# Patient Record
Sex: FEMALE | Race: WHITE
[De-identification: names, ages, dates, MRNs, and addresses within clinical notes are randomized per-mention and may not be internally consistent; named-entity substitution may affect disease eponyms.]

---

## 2017-01-18 ENCOUNTER — HOSPITAL ENCOUNTER (OUTPATIENT)
Dept: HOSPITAL 61 - PNCL | Age: 77
Discharge: HOME | End: 2017-01-18
Attending: ANESTHESIOLOGY
Payer: MEDICARE

## 2017-01-18 DIAGNOSIS — M51.26: ICD-10-CM

## 2017-01-18 DIAGNOSIS — M54.16: Primary | ICD-10-CM

## 2017-01-18 DIAGNOSIS — M48.06: ICD-10-CM

## 2017-01-18 PROCEDURE — G0463 HOSPITAL OUTPT CLINIC VISIT: HCPCS

## 2017-04-17 ENCOUNTER — HOSPITAL ENCOUNTER (OUTPATIENT)
Dept: HOSPITAL 61 - PNCL | Age: 77
Discharge: HOME | End: 2017-04-17
Attending: ANESTHESIOLOGY
Payer: MEDICARE

## 2017-04-17 DIAGNOSIS — M48.06: ICD-10-CM

## 2017-04-17 DIAGNOSIS — M54.16: Primary | ICD-10-CM

## 2017-04-17 DIAGNOSIS — M51.26: ICD-10-CM

## 2017-04-17 PROCEDURE — G0463 HOSPITAL OUTPT CLINIC VISIT: HCPCS

## 2017-04-18 NOTE — PAIN
DATE OF SERVICE:  04/17/2017



PROGRESS NOTE FOR PAIN CLINIC 



DIAGNOSES:  Lumbar radiculopathy with lumbar herniated disk, lumbar spinal

stenosis with spinal cord stimulator.



HISTORY OF PRESENT ILLNESS:  The patient is a 77-year-old female, who returns

for followup status post medication management with both Duragesic patches and

oxycodone.  The patient reports she has been doing very well with this, her pain

very stable.  Using a Duragesic patch in both 125 mcg sizes combining these ____

125/150 as necessary.  The patient reports she has only been using 150 dose

rarely, but was having a flareup with some diverticulitis and did use these more

during that time.  About a month ago, the patient reports she is doing well with

medications, has no significant side effects except for constipation, which she

is using ____ for pain, is controlling it very effectively by her report.  The

patient is maintaining hydration has no new motor or sensory deficits or other

complaints, but still significant pain in the low back, bilateral lower

extremities, worse on the left than the right, also in the neck and shoulders,

upper extremities with some aching, sharp, dull, shooting pain as cramping and

stabbing, burning, tingling, constant times worse with activities.  The patient

reports it is controlled, but she always has some pain anywhere from 3 to 9 on a

scale of 10. Describing depending on activity that is worse with activity.  The

patient reports no new motor or sensory deficits, no new bowel or bladder

incontinence, no side effects with the medications reports overall about 75%-80%

improvement with medicines by themselves.



PHYSICAL EXAMINATION:

VITAL SIGNS:  Today, the patient's blood pressure 130/79, pulse 96, respirations

are 18, temperature is 98.2 degrees Fahrenheit, height is 69 inches, weighs 220

pounds.

GENERAL:  The patient is awake, alert, oriented, appropriate, very pleasant

demeanor.

The patient is accompanied by her ____.

HEENT:  Head shows normocephalic, atraumatic.  Extraocular movements are intact

and symmetrical.  The patient wears eye glasses.  Oral cavity, her mucous

membranes are moist and pink.  Dentition is intact.

NECK:  Shows anterior throat supple without palpable lymphadenopathy noted. 

Swallow reflex is symmetrical.

CHEST:  Shows normal on inspection.  Breath sounds are clear to auscultation

bilaterally.

HEART:  Shows S1 and S2 clear.  No murmurs auscultated.

ABDOMEN:  Soft, nontender, nondistended.  No palpable organomegaly.  No rebound

or guarding demonstrated.

BACK:  Shows spine grossly midline.  There is some well-healed surgical scarring

in the lumbar distribution and spinal cord stimulator is easily palpable over

the right posterior gluteus paraspinous musculature shows some mild tenderness

to palpation, but only diffusely without significant atrophy, hypertrophy, no

radiation of pain, no tenderness over the sacrum or sacroiliac regions.

EXTREMITIES:  Lower extremities showed deep tendon reflexes 1+ in the patellar

and tendo calcaneus tendons.  Motor exam is approximately 4 on a scale of 5, but

is equal and symmetrical with dorsiflexion, extension a 3 or 4 on a scale of 5

with quadriceps and hamstring flexion again symmetrical right and left.



Options were discussed with the patient.  At this time, the patient's old chart

was reviewed as her current medication regimen updated.  Current review of

systems updated today as well.  We will refill the patient's Duragesic patches

at 100 mcg patches also 25 mcg patches also refilled oxycodone at 10 mg with

instructions, side effects to be aware of discussed with each of medications. 

The patient will have a 90-day supply will follow up in 90 days or sooner as

necessary.

 



______________________________

JERRY CUEVA MD



DR:  FAYE/zane  JOB#:  [id number] / [id number]

DD:  04/17/2017 12:55  DT:  04/18/2017 00:09

## 2017-07-10 ENCOUNTER — HOSPITAL ENCOUNTER (OUTPATIENT)
Dept: HOSPITAL 61 - PNCL | Age: 77
Discharge: HOME | End: 2017-07-10
Attending: ANESTHESIOLOGY
Payer: MEDICARE

## 2017-07-10 DIAGNOSIS — M51.16: ICD-10-CM

## 2017-07-10 DIAGNOSIS — M48.06: Primary | ICD-10-CM

## 2017-07-10 PROCEDURE — G0463 HOSPITAL OUTPT CLINIC VISIT: HCPCS

## 2018-04-20 ENCOUNTER — HOSPITAL ENCOUNTER (OUTPATIENT)
Dept: HOSPITAL 61 - PNCL | Age: 78
Discharge: HOME | End: 2018-04-20
Attending: ANESTHESIOLOGY
Payer: MEDICARE

## 2018-04-20 DIAGNOSIS — M48.061: ICD-10-CM

## 2018-04-20 DIAGNOSIS — M51.16: Primary | ICD-10-CM

## 2018-07-13 ENCOUNTER — HOSPITAL ENCOUNTER (OUTPATIENT)
Dept: HOSPITAL 61 - PNCL | Age: 78
Discharge: HOME | End: 2018-07-13
Attending: ANESTHESIOLOGY
Payer: MEDICARE

## 2018-07-13 DIAGNOSIS — M51.16: Primary | ICD-10-CM

## 2018-07-13 DIAGNOSIS — M48.061: ICD-10-CM

## 2018-10-16 ENCOUNTER — HOSPITAL ENCOUNTER (OUTPATIENT)
Dept: HOSPITAL 61 - PNCL | Age: 78
Discharge: HOME | End: 2018-10-16
Attending: ANESTHESIOLOGY
Payer: MEDICARE

## 2018-10-16 DIAGNOSIS — M48.061: ICD-10-CM

## 2018-10-16 DIAGNOSIS — M51.16: Primary | ICD-10-CM

## 2018-10-16 PROCEDURE — G0463 HOSPITAL OUTPT CLINIC VISIT: HCPCS

## 2018-10-16 NOTE — PAIN
DATE OF SERVICE:  10/16/2018



PROGRESS NOTE FOR PAIN CLINIC



DIAGNOSES:  Lumbar radiculopathy with lumbar herniated disk, lumbar spinal

stenosis with spinal cord stimulator therapy.



SUBJECTIVE:   The patient is a 78-year-old female who returns for follow up

status post medication management as well as spinal cord stimulator therapy,

which she has had for several years.  The patient returns, I had spoken with her

son about 2 weeks ago, as she had increased amount pain, we have prescribed some

oxycodone for her at 20 mg size that she was currently taking 10.  These have

not helped significantly and the patient had actually been breaking them in half

and using them at 10 mg size more than not.  The patient also wearing Duragesic

patches 100 mcg.  In addition, a 50 or 25 or both to make up to 175 mcg.  The

patient reported she was cleaning her house a few weeks ago and this seems to

exacerbate the pain in her low back, bilateral lower extremities, especially in

her left leg and also in the shoulders, upper back and neck.  The patient

reports the pain is doing better now; however, still not some quite as good as

it was prior to her cleaning.  She is improved.  The patient reports it has been

awaking her from sleep at night, has difficulty especially in the low back.  Her

stimulator reports she is still getting good coverage, but is not decreasing the

pain to sufficient amount especially in the shoulders and upper back.  The

patient reports no new motor or sensory deficits.  No new bowel or bladder

incontinence.  The patient reports the pain is tingling, burning, stabbing,

aching, dull, tight, constant, becoming more severe, on and off in intensity,

worse with walking, standing and changing positions or stretching her legs.  The

patient does do some exercises, which she tries to do regularly and this

exacerbates some left hip pain as she was having over the past few days as well.

 The patient rates her pain as 8 on a scale of 10 at its worst, 6-7 on average,

4-5 at its least and is a 6 today.



PHYSICAL EXAMINATION:

VITAL SIGNS:  Blood pressure 160/83, pulse 87, respirations 16, temperature 98.2

degrees Fahrenheit, height and weight were deferred per the patient request.

HEENT:  Head shows normocephalic, atraumatic.  The patient wears eye glasses. 

Extraocular movements are intact and symmetrical.  Oral cavity:  Mucous

membranes moist and pink.  Dentition is intact.  The patient is sitting upright

in the wheelchair.

CHEST:  Shows normal with inspection.  Breath sounds are clear to auscultation

bilaterally.

NECK:  Shows anterior throat supple without palpable lymphadenopathy noted. 

Swallow reflex is symmetrical.  Good rotation of motion both laterally as well

as extension and flexion without significant difficulty.

CARDIOVASCULAR:  Heart sounds S1, S2 clear.

ABDOMEN:  Obese, soft, nontender, nondistended, no palpable organomegaly is

noted.

BACK:  Shows spine grossly in the midline.  Exaggeration of thoracic kyphosis is

mild to moderate with some flattening lumbar lordotic curvature with well-healed

surgical scars from a spinal cord stimulator implant with palpation shows some

moderate tenderness throughout the upper, middle and lower distribution of the

paraspinous muscles, but diffusely without radiation or trigger points. 

Moderate tenderness over the posterior sacrum and sacroiliac regions very mild

tenderness.  The patient shows good rotational motion of lumbar spine without

significant increase in pain excluding extension and flexion.

EXTREMITIES:  The patient's lower extremity deep tendon reflexes 1+ at the

patellar tendons.  Motor exam was approximately 4 on a scale of 5 with

dorsiflexion and extension, and 3 on a scale of 5 with quadriceps and hamstring

flexion.  Peripheral pulses are 1+ without edema bilaterally.



Options were discussed with the patient and the patient's son who accompanies

her to visit today.  We will change the patient's oxycodone to 20 mg size with

nausea.  She will break these in half if necessary as she feels more comfortable

with a smaller dose, but sometimes needs a higher dose.  We discussed this in

detail today as most of the Duragesic patch will maintain at the base of 100 mcg

with an additional 50 to 75 mcg.  Again, she is somewhat lenient about going to

200 mcg dose or anything consistently higher than 150, but we have the 25's

available in case if she does have some of the morning flares of pain as this

helped her very well here in the past few weeks by increasing to 175, she would

stay at 125-150 otherwise.  This is discussed in detail with her and her son. 

The patient was given medications for a 90-day supply with instructions to call

sooner if necessary and discussed with her son as well.  They were given

instructions as well as side effects to be aware of each of medications and will

follow up as scheduled.

 



______________________________

JERRY CUEVA MD



DR:  Jane  JOB#:  [id number] / [id number]

DD:  10/16/2018 12:52  DT:  10/16/2018 20:04

## 2019-01-08 ENCOUNTER — HOSPITAL ENCOUNTER (OUTPATIENT)
Dept: HOSPITAL 61 - PNCL | Age: 79
Discharge: HOME | End: 2019-01-08
Attending: ANESTHESIOLOGY
Payer: MEDICARE

## 2019-01-08 DIAGNOSIS — M48.061: ICD-10-CM

## 2019-01-08 DIAGNOSIS — Z79.899: ICD-10-CM

## 2019-01-08 DIAGNOSIS — M51.16: Primary | ICD-10-CM

## 2019-01-08 PROCEDURE — G0463 HOSPITAL OUTPT CLINIC VISIT: HCPCS

## 2019-01-08 NOTE — PAIN
DATE OF SERVICE:  01/08/2019



DIAGNOSES:  Lumbar radiculopathy with lumbar spinal stenosis, lumbar herniated

disk.



HISTORY OF PRESENT ILLNESS:  The patient is a 78-year-old female who returns for

followup status post medication management with both Duragesic patches as well

as oxycodone 20 mg.  The patient also has spinal cord stimulator which she

reports still getting good stimulation. She is keeping it charged with good

coverage in the low back, bilateral lower extremities.  The patient reports she

is not happy with the way she feels with the oxycodone.  First of all, they are

too small for her to cut them effectively which she is used to cutting her pain

pills in half,  also it causes her to fall asleep quickly and then wake up about

an hour later where she cannot get back to sleep.  We discussed about going back

to her older medications at lower dose, and she would like to proceed with that.

 The patient is also doing better with her fentanyl patches, no side effects or

skin irritation, but has been using 100+50 and 25 pretty consistently and would

like to go to less number of patches, and we will change that as well.  The

patient reports otherwise doing fairly well, still significant pain in the low

back, mid back, upper back, bilateral lower extremities, again good coverage of

the stimulation, but not relieving the pain significantly despite the good

coverage regions.  The patient reports the pain is a 9 on a scale of 10 at its

worst, 7 on average, 6 at its least and is a 6 today.  The patient reports it is

aching, sharp, shooting, stabbing, burning, tingling, becoming more constant and

severe with walking, standing, better with sitting or lying down, but again

awakens her from sleep fairly frequently about every 2-3 hours on average.  The

patient reports no new motor or sensory deficits, no bowel or bladder

incontinence or other complaints.  She has been trying some CBD oil and thinks

it may help a little bit without any side effects.  The patient overall reports

her improvement in pain about 50-75% with the current medications without side

effects from her Duragesic patches or the oxycodone as well.



PHYSICAL EXAMINATION:

VITAL SIGNS:  The patient's blood pressure 165/87, pulse is 87, respirations 18,

temperature 98.2 degrees Fahrenheit, height is 69 inches.  The patient deferred

weight as she is in a motorized wheelchair today.

GENERAL:  The patient is awake, alert, oriented, appropriate, very pleasant

demeanor.  The patient is accompanied by her son.

HEENT:  Head shows normocephalic, atraumatic.  Extraocular movements  are intact

and symmetrical.  Oral cavity, mucous membranes are moist and pink.  Dentition

is intact.

NECK:  Shows anterior throat supple without palpable lymphadenopathy is noted. 

Swallow reflex is symmetrical.

CHEST:  Shows normal on inspection.  Breath sounds clear to auscultation

bilaterally.

HEART:  Shows S1, S2 clear.  No murmurs auscultated.

ABDOMEN:  Obese, soft, nontender, nondistended.  No palpable organomegaly is

noted.  No rebound or guarding demonstrated.

BACK:  Shows spine grossly in the midline.  Slight exaggeration of thoracic

kyphosis, mild flattening of lumbar lordotic curvature.  Lumbar paraspinous

muscle shows symmetrical on inspection.  On palpation shows some moderate

tenderness diffusely throughout the thoracic paraspinous, lumbar paraspinous

without radiation or trigger points.  The patient has good rotational motion of

lumbar spine, both laterally as well as extension and flexion.

EXTREMITIES:  Lower extremities show deep tendon reflexes 1+ in the patellar and

tendo calcaneus tendons are equal.  Motor exam is approximately 4 on a scale of

5, but equal and symmetrical with dorsiflexion and extension.  Peripheral pulses

are 1+ posterior tibial bilaterally.



Options were discussed with the patient.  The patient's old chart was reviewed

as her current medication regimen updated.  Current review of systems updated

today as well.  We will refill the patient's Duragesic patch in a 100 mcg size

plus a 75 mcg size for 2 patches only q.1 every 72 hours.  Also, we will

decrease the oxycodone to 10 mg/325 as it is a larger pill, easy for her to cut

and reduce the dose and varied as she feels more comfortable with.  The patient

was given instruction as well as side effects to be aware of each of the

medications as well as instruction with her son who was present with her and

helps her manage her medications.  The patient had appropriate K-TRACS reporting

as well as appropriate urinalysis to date.  We refilled the patient's medicines

for a 90-day supply.  The patient and her son will contact us within the week if

not working significantly well or any potential problems or side effects with

the medications.  The patient will follow up otherwise in approximately 3 months

as scheduled.

 



______________________________

JERRY CUEVA MD



DR:  Jane  JOB#:  [id number] / [id number]

DD:  01/08/2019 11:45  DT:  01/08/2019 13:47

## 2019-03-26 ENCOUNTER — HOSPITAL ENCOUNTER (OUTPATIENT)
Dept: HOSPITAL 61 - PNCL | Age: 79
Discharge: HOME | End: 2019-03-26
Attending: ANESTHESIOLOGY
Payer: MEDICARE

## 2019-03-26 DIAGNOSIS — M54.16: ICD-10-CM

## 2019-03-26 DIAGNOSIS — M48.061: Primary | ICD-10-CM

## 2019-03-26 PROCEDURE — G0463 HOSPITAL OUTPT CLINIC VISIT: HCPCS

## 2019-03-27 NOTE — PAIN
DATE OF SERVICE:  03/26/2019



PROGRESS NOTE FOR PAIN CLINIC 



DIAGNOSES:  Lumbar radiculopathy with lumbar herniated disk and lumbar spinal

stenosis.



HISTORY OF PRESENT ILLNESS:  The patient is a 79-year-old female who returns for

followup status post medication management with both Duragesic patches and

oxycodone.  The patient reports she has been doing quite well, on a very stable

regimen, decreasing her pain significantly by about 75% overall in the low back,

mid back and upper back.  The patient reports no side effects with these

patches, she has been alternating sites where she applies these but using 175

mcg in 2 sizes, 100 plus 75 mcg on most days.  The patient is trying to decrease

this by decreasing the 75 mcg patch but is having difficulty as she is having

more pain _____.  The patient reports she has become more sedentary after her

last visit.  She is worried about her  who passed away several years ago

and thinks by him a lot and this makes her depressed.  She does not feel like

getting out and doing things.  Her son does take care of her and is with her

very frequently and accompanies her to visit today as well, but she reports this

is a generally very sedentary and the pain is, however, fairly well controlled

with the current medication regimen.  The patient reports she does sleep well at

night, does not awaken her from sleep, worse with standing and walking, better

with sitting or lying down in the mid back and low back as well as the upper

back.  The patient describes the pain as aching, sharp, dull, tight, shooting,

tingling, burning, cramping, stabbing, radiating, can be constant and severe. 

She is on her feet for more than about 10-15 minutes but when she is sitting or

lying down, the pain is fairly well controlled.  The patient reports again no

significant side effects of the medication.  She is still using some CBD oil,

which she feels may be helpful with decreasing the pain as well.  The patient

rates her pain as 8 on a scale of 10 at its worst, 7 on average, 4 at its least

and is a 4 today.  The patient reports no new changes or other complaints or

deficits.



PHYSICAL EXAMINATION:

VITAL SIGNS:  The patient's blood pressure 146/87, pulse 81, respirations 18 and

temperature 98.5 degrees Fahrenheit.  Height is 69 inches.

GENERAL:  The patient is awake, alert, oriented, appropriate and very pleasant

demeanor.

HEENT:  Head shows normocephalic and atraumatic.  Extraocular movements are

intact and symmetrical.  Oral cavity:  Mucous membranes moist and pink. 

Dentition is intact.

NECK:  Shows anterior throat supple without palpable lymphadenopathy noted. 

Swallow reflex is symmetrical.

CHEST:  Shows normal with inspection.  Breath sounds are clear to auscultation

bilaterally.

HEART:  Shows S1 and S2 clear.  No murmurs auscultated.

ABDOMEN:  Obese, soft, nontender and nondistended.

BACK:  Shows spine grossly in the midline.  Slight exaggeration of the thoracic

kyphosis and normal cervical lordotic curvature and some flattening of the

lumbar lordotic curvature.  Paraspinous muscle shows symmetrical on inspection

with palpation throughout the thoracic and the lumbar distribution, mildly

tender throughout the upper, middle and lower distribution of the paraspinous

muscles to a mild extent without radiation.  The patient has good rotational

motion of the lumbar spine without exacerbation of pain both laterally as well

as extension and flexion.

EXTREMITIES:  The patient's lower extremities show deep tendon reflexes 1+ in

the patellar and tendo-calcaneus tendons are equal.  Motor exam is approximately

3-4 on a scale of 5 but symmetrical with dorsiflexion, extension, quadriceps and

hamstring flexion and intact.  Peripheral pulses are 1+.  No peripheral edema is

noted bilaterally.



Options were discussed with the patient.  The patient's old chart was reviewed

as well as her current medication regimen updated.  Current review of systems

updated today as well.  We will refill the patient's Duragesic patch 100 plus 75

mcg sizes as well as oxycodone 10 mg.  The patient was given instructions as

well as side effects to be aware of each of the medications.  The patient had

appropriate K-TRACS reporting as well as appropriate urinalysis to date.  We

will obtain a urinalysis today as a routine screening.  The patient was given a

90-day supply of each of the medications again with instructions, side effects

to be aware of.  These were discussed with she and her son as well and will

return to the clinic in approximately 90 days or sooner if necessary.

 



______________________________

JERRY CUEVA MD



DR:  FAYE/zane  JOB#:  [id number] / [id number]

DD:  03/26/2019 13:48  DT:  03/27/2019 04:25

## 2019-07-09 ENCOUNTER — HOSPITAL ENCOUNTER (OUTPATIENT)
Dept: HOSPITAL 61 - PNCL | Age: 79
Discharge: HOME | End: 2019-07-09
Attending: ANESTHESIOLOGY
Payer: MEDICARE

## 2019-07-09 DIAGNOSIS — M51.16: Primary | ICD-10-CM

## 2019-07-09 DIAGNOSIS — M48.061: ICD-10-CM

## 2019-07-09 DIAGNOSIS — Z79.891: ICD-10-CM

## 2019-07-09 DIAGNOSIS — M40.294: ICD-10-CM

## 2019-07-09 PROCEDURE — G0463 HOSPITAL OUTPT CLINIC VISIT: HCPCS

## 2019-07-09 NOTE — PAIN
DATE OF SERVICE:  07/09/2019



PROGRESS NOTE FOR PAIN CLINIC 



DIAGNOSES:  Lumbar radiculopathy with lumbar herniated disk and lumbar spinal

stenosis.



HISTORY OF PRESENT ILLNESS:  The patient is a 79-year-old female who returns for

followup status post medication management with both Duragesic patches and

oxycodone.  The patient reports she has been doing fairly well with this very

stable but complains of some increased pain in her low back with mobility and

with talking to her and her son, who accompanies her to visit today, she is

becoming more deconditioned with time and also slightly short of breath and is

wearing home oxygen but the patient reports she is having more flareups with her

pain in her back and legs and she has been doing less and less and is only

getting up out of a chair every 3-4 times a day.  The patient reports she sleeps

well at night but is using a reclining type bed that helps posture her so she

can breathe easier with same time as she is deconditioned and legs feel weaker

too.  The patient reports it is aching, sharp, dull, shooting across the back,

stabbing in the legs, tingling in the leg, sometimes constant, sometimes severe.

 The patient reports no new motor or sensory deficits.  Reports her pain

medication does decrease the pain significantly by about 75% or so as she has

had previously.  Using Duragesic patch at 100 plus 75 mcg patches and oxycodone

up to 4 tablets a day, most days she is taking 3 but sometimes only 1 to 1-1/2. 

The patient reports no new motor or sensory deficits and again, no side effects

with the medications.



PHYSICAL EXAMINATION:

VITAL SIGNS:  The patient's blood pressure is 137/71, pulse is 86, respirations

are 18 and temperature is 98.4 degrees Fahrenheit.  Height is 69 inches.  

GENERAL:  The patient is awake, alert, oriented, appropriate and very pleasant

demeanor.  The patient's son accompanies her to visit today.  The patient is

using a regular wheelchair for mobility and oxygen by nasal cannula.

HEENT:  Head shows normocephalic and atraumatic.  Extraocular movements are

intact and symmetrical.  Oral cavity, mucous membranes are moist and pink.  

NECK:  Shows anterior throat supple.

CHEST:  Shows breath sounds clear bilaterally.  No rales, rhonchi or wheezes

noted and the patient is able to have deep breath excursions without significant

difficulty without cough.

HEART:  Shows S1 and S2 clear.

ABDOMEN:  Obese but soft, nontender and nondistended.

BACK:  Shows spine grossly in the midline.  Slight exaggeration of the thoracic

kyphosis and some minor flattening of the lumbar lordotic curvature.  The

patient does have well-healed surgical scars and spinal cord stimulator

placement.  The patient reports it is still functioning adequately as well and

giving it charged.

EXTREMITIES:  Lower extremities show deep tendon reflex 1+ in the patella and

tendo-calcaneus tendons.  Motor exam is approximately 3-4 on a scale of 5 with

dorsiflexion, extension, quadriceps and hamstring flexion 3-4/5 as well but

symmetrical bilaterally.



Options were discussed with the patient.  The patient's old chart was reviewed

as well as her current medication regimen  updated.  Current review of systems

updated today as well and we will refill the patient's medications, Duragesic

patch as well as oxycodone.  The patient was given instruction as well as side

effects to be aware of with the medications, given to her son as well who helps

manage her medications.  We discussed at great length today about physical

therapy and home health therapy, which I feel, will be very beneficial.  She is

becoming more deconditioned and doing less and less at home.  She is agreeable

to try this home physical therapy and we will make those arrangements.  The

patient will be given a 3-month prescription for the medications.  She has had

appropriate K-TRACS reporting as well as appropriate urinalysis to date and was

given instructions for the medications as well.  The patient will follow up in

approximately 90 days or sooner as necessary.  Again, we will order physical

therapy to start for home visit therapy for conditioning resistive treatment and

rotational motion of hips and legs as well as the low back and some myofascial

release in the low back as well.  The patient will follow up as scheduled.

 



______________________________

JERRY CUEVA MD



DR:  FAYE/zane  JOB#:  [id number] / [id number]

DD:  07/09/2019 14:10  DT:  07/09/2019 14:52

## 2019-11-25 ENCOUNTER — HOSPITAL ENCOUNTER (OUTPATIENT)
Dept: HOSPITAL 61 - PNCL | Age: 79
End: 2019-11-25
Attending: ANESTHESIOLOGY
Payer: MEDICARE

## 2019-11-25 DIAGNOSIS — M48.061: ICD-10-CM

## 2019-11-25 DIAGNOSIS — M51.16: Primary | ICD-10-CM

## 2019-11-25 PROCEDURE — G0463 HOSPITAL OUTPT CLINIC VISIT: HCPCS

## 2019-11-26 NOTE — PAIN
DATE OF SERVICE:  11/25/2019



PROGRESS NOTE FOR PAIN CLINIC



DIAGNOSES:  Lumbar radiculopathy with lumbar herniated disk, lumbar spinal

stenosis with spinal cord stimulator therapy.



HISTORY OF PRESENT ILLNESS:  The patient is a 79-year-old female who returns for

followup status post medication management with Duragesic patches as well as

oxycodone.  The patient reports she has been doing fairly well, has been wearing

200 mcg total for about the past month or so.  The patient has 75 mcg patch as

well as some 25 mcg patch and she has been putting them together to make 200

mcg, which she tolerates better than taking the oxycodone for breakthrough pain

and she reports it makes her somewhat "jittery."  The patient reports she

still takes it about 2-3 a day of the oxycodone.  She has a fairly regimented

schedule that she has been doing very well with.  It has been a very stable

regimen.  The patient reports about 70% improvement overall with the medication

with reduction in pain, but still significant pain, especially in the feet and

ankles where she has difficulty with weightbearing.  The patient is essentially

wheelchair bound most of her day, but she still does get up and down and feeds

her cat without being in the chair as well.  The patient's son is with her today

and takes care of her throughout the days and nights as necessary.  The patient

reports no new motor or sensory deficits, no new side effects with medication. 

Describes the pain as dull and aching, burning, tingling, sharp and shooting in

the low back and legs and severe in the feet with weightbearing.  With sitting

down, it is much better with much significant decrease in pain with resting,

lying down, but is getting changing positions.  She does have an electric

scooter she uses at home and getting to the scooter and out of it is when the

most pain occurs.  The patient reports her left hip and leg and ankle are very

painful the last few weeks where she is unable to walk, has been using the

scooter more frequently.  The patient rates the pain a 9 on a scale of 10 at its

worst, in the past week 7-8 on average, 6 at its least and is a 7 today.



PHYSICAL EXAMINATION:

VITAL SIGNS:  The patient's blood pressure 145/81, pulse 90, respirations 18,

temperature is 98.3 degrees Fahrenheit, height is 5 feet 8 inches, weight is 225

pounds.

GENERAL:  The patient is awake, alert, oriented, appropriate, very pleasant

demeanor.  Again, accompanied by her son.

HEENT:  Shows normocephalic, atraumatic.  Extraocular movements are intact and

symmetrical.  Oral cavity:  Mucous membranes are moist and pink.  Dentition is

intact.

NECK:  Shows anterior throat supple without palpable lymphadenopathy noted. 

Swallow reflex symmetrical.

CHEST:  Shows normal on inspection.  Breath sounds are clear bilaterally, but

distant.  The patient is wearing nasal cannula O2 at 2 liters flow.

HEART:  Shows S1, S2 clear.  No murmurs auscultated.

ABDOMEN:  Obese, soft, nontender, nondistended.  No palpable organomegaly is

noted.  No rebound or guarding demonstrated.

BACK:  Shows spine grossly in the midline, slight exaggerated thoracic kyphosis,

normal cervical lordotic curvature and some flattening of lumbar lordotic

curvature with easily palpable spinal cord stimulator in the right posterior

gluteus with well-healed surgical scarring with this as well as in the lumbar

spine.

EXTREMITIES:  Lower extremities show deep tendon reflexes at 1+ in the patellar

and tendo calcaneus tendons.  Motor exam is approximately 3-4 on a scale of 5,

but symmetrical with dorsiflexion and extension and the patient was not asked to

weight bear by her request, which will be honored today, not to get up out of

her chair as it is significantly painful for her to change positions and

transfer to different positions.



PLAN:  Options were discussed with the patient and the patient's son and we will

refill the patient's medications.  We will change the Duragesic patches to 100

mcg 2 patches q. 3 days.  Also, the patient will have a 75 mcg patch available

to decrease this rate if she so chooses and having less pain as she likes to

have this availability and flexibility.  We discussed this extensively with she

and her son and he will hold the 75 mcg patches unless she needs to have those

filled for less medication total to take one 75 instead of the 200, however,

over the past month or so, she has been using 200 combination very consistently.

 Also, refilled patient's oxycodone.  The patient was given instructions as well

as side effects to be aware of each of the medications.  She has had appropriate

[facility]-TRACS reporting as well as appropriate urinalysis to date and will follow up

in approximately 90 days or sooner as necessary.  The patient's son has been

very good at keeping in contact with our clinic and keeping us updated on any

changes or progress or other concerns and will continue to do so as well.

 



______________________________

JERRY CUEVA MD



DR:  FAYE/zane  JOB#:  [id number] / [id number]

DD:  11/25/2019 14:11  DT:  11/25/2019 21:36